# Patient Record
Sex: FEMALE | Race: WHITE | NOT HISPANIC OR LATINO
[De-identification: names, ages, dates, MRNs, and addresses within clinical notes are randomized per-mention and may not be internally consistent; named-entity substitution may affect disease eponyms.]

---

## 2020-12-07 ENCOUNTER — NON-APPOINTMENT (OUTPATIENT)
Age: 30
End: 2020-12-07

## 2020-12-07 PROBLEM — Z00.00 ENCOUNTER FOR PREVENTIVE HEALTH EXAMINATION: Status: ACTIVE | Noted: 2020-12-07

## 2020-12-21 ENCOUNTER — APPOINTMENT (OUTPATIENT)
Dept: OBGYN | Facility: CLINIC | Age: 30
End: 2020-12-21

## 2023-06-11 ENCOUNTER — NON-APPOINTMENT (OUTPATIENT)
Age: 33
End: 2023-06-11

## 2023-08-15 ENCOUNTER — NON-APPOINTMENT (OUTPATIENT)
Age: 33
End: 2023-08-15

## 2024-04-05 ENCOUNTER — EMERGENCY (EMERGENCY)
Facility: HOSPITAL | Age: 34
LOS: 0 days | Discharge: HOME | End: 2024-04-05
Attending: STUDENT IN AN ORGANIZED HEALTH CARE EDUCATION/TRAINING PROGRAM
Payer: COMMERCIAL

## 2024-04-05 VITALS
OXYGEN SATURATION: 98 % | HEART RATE: 93 BPM | WEIGHT: 132.06 LBS | SYSTOLIC BLOOD PRESSURE: 112 MMHG | TEMPERATURE: 98 F | DIASTOLIC BLOOD PRESSURE: 59 MMHG | RESPIRATION RATE: 16 BRPM | HEIGHT: 60 IN

## 2024-04-05 DIAGNOSIS — Z3A.36 36 WEEKS GESTATION OF PREGNANCY: ICD-10-CM

## 2024-04-05 DIAGNOSIS — O22.43 HEMORRHOIDS IN PREGNANCY, THIRD TRIMESTER: ICD-10-CM

## 2024-04-05 DIAGNOSIS — K62.89 OTHER SPECIFIED DISEASES OF ANUS AND RECTUM: ICD-10-CM

## 2024-04-05 DIAGNOSIS — O99.613 DISEASES OF THE DIGESTIVE SYSTEM COMPLICATING PREGNANCY, THIRD TRIMESTER: ICD-10-CM

## 2024-04-05 PROCEDURE — 99284 EMERGENCY DEPT VISIT MOD MDM: CPT

## 2024-04-05 PROCEDURE — 99284 EMERGENCY DEPT VISIT MOD MDM: CPT | Mod: 25

## 2024-04-05 PROCEDURE — 46083 INC THROMBOSED HROID XTRNL: CPT

## 2024-04-05 RX ORDER — CEPHALEXIN 500 MG
1 CAPSULE ORAL
Qty: 14 | Refills: 0
Start: 2024-04-05 | End: 2024-04-11

## 2024-04-05 NOTE — ED ADULT NURSE NOTE - CHIEF COMPLAINT QUOTE
Patient presents to ED c/o multiple hemorrhoids, one possibly thrombosed. Patient with recent diarrhea. 8 months pregnant.

## 2024-04-05 NOTE — ED PROVIDER NOTE - PATIENT PORTAL LINK FT
You can access the FollowMyHealth Patient Portal offered by [facility] by registering at the following website: [email or web address]. By joining [facility]’s FollowMyHealth portal, you will also be able to view your health information using other applications (apps) compatible with our system.

## 2024-04-05 NOTE — ED PROVIDER NOTE - PROGRESS NOTE DETAILS
TERRA:  on bedside US. Partial thrombosed hemorrhoid was excised with small amount of material expressed.  Encouraged supportive care including sitz bath's, advised.  Will give rapid referral for colorectal surgery.  Supportive care and return precaution advised.  Patient is comfortable with plan    Patient to be discharged from ED. Any available test results were discussed with patient and/or family. Verbal instructions given, including instructions to return to ED immediately for any new, worsening, or concerning symptoms. Patient endorsed understanding. Written discharge instructions additionally given, including follow-up plan.

## 2024-04-05 NOTE — ED PROVIDER NOTE - OBJECTIVE STATEMENT
33F  currently 36 weeks Pregnant presents ED for evaluation of rectal pain states thinks she think she has a hemorrhoid.  Patient states she has had no complications to the pregnancy and she is doing well, normal fetal movement however feels lumps to the rectum that are painful x 2 days.  She denies straining to have bowel movement.  Denies fever, CP, SOB, abdominal pain, N/V/D, vaginal bleeding or discharge.

## 2024-04-05 NOTE — ED ADULT NURSE NOTE - OBJECTIVE STATEMENT
pt presents to ED with c/o rectal pain. pt states she has hemorrhoids tried OTC medications but it did not help. pt states she went to her OB Doctor but they sent her here because they think its thrombosed. pt denies fever.

## 2024-04-05 NOTE — ED PROVIDER NOTE - CARE PLAN
1 Principal Discharge DX:	Hemorrhoids, thrombosed   Principal Discharge DX:	Hemorrhoids, thrombosed  Secondary Diagnosis:	Single pregnancy, third trimester

## 2024-04-05 NOTE — ED PROVIDER NOTE - CARE PROVIDER_API CALL
Errol Charles  Colon/Rectal Surgery  [address], [address] [place] [address]  Phone: [phone]  Fax: [phone]  Established Patient  Follow Up Time: 1-3 Days

## 2024-04-05 NOTE — ED PROVIDER NOTE - ATTENDING CONTRIBUTION TO CARE
I personally evaluated the patient. I reviewed the Resident’s or Physician Assistant’s note (as assigned above), and agree with the findings and plan except as documented in my note.  33F  currently 36 weeks Pregnant presents ED for evaluation of rectal pain states thinks she think she has a hemorrhoid.  Patient states she has had no complications to the pregnancy and she is doing well, normal fetal movement however feels lumps to the rectum that are painful x 2 days.    CON: appears stated age, pleasant, no acute distress, HENMT: normocephalic, atraumatic, anicteric, no conjunctival injection,  CV: regular rhythm, distal pulses intact, RESP: no acute respiratory distress, no stridor, breathing comfortably on RA , GI:  soft, nontender, no rebound, no guarding, SKIN: no wounds MSK: no deformities, NEURO: no gross motor or sensory deficit Psychiatric: appropriate mood, appropriate affect  pocus showed   gu exam done at my presence with our resident  small thrombosed external hemorrhoid + one external hemorrhoid reducible none thrombosed  will give pain meds and excise thrombosed hemorrhoid

## 2024-04-05 NOTE — ED PROVIDER NOTE - PHYSICAL EXAMINATION
VITAL SIGNS: I have reviewed nursing notes and confirm.  CONSTITUTIONAL: uncomfortable appearing female I nNAD  SKIN: Skin exam is warm and dry, no acute rash.  HEAD: Normocephalic; atraumatic.  EYES: PERRL, EOM intact; conjunctiva and sclera clear.  ENT: mmm  CARD: S1, S2 normal; no murmurs, gallops, or rubs. Regular rate and rhythm.  RESP: Normal respiratory effort, no tachypnea or distress. Lungs CTAB, no wheezes, rales or rhonchi.  ABD: soft, gravid, NT  rectal exam chaperoned by Dr. Graves shows 2 small hemorrhoids, one of which is partially thrombosed  EXT: Normal ROM. No clubbing, cyanosis or edema.  NEURO: Alert, oriented. Grossly unremarkable. No focal deficits.  PSYCH: Cooperative, appropriate.

## 2024-04-05 NOTE — ED PROVIDER NOTE - NSFOLLOWUPINSTRUCTIONS_ED_ALL_ED_FT
Our Emergency Department Referral Coordinators will be reaching out to you in the next 24-48 hours from 9:00am to 5:00pm with a follow up appointment. Please expect a phone call from the hospital in that time frame. If you do not receive a call or if you have any questions or concerns, you can reach them at   [phone] (colorectal surgery)    ----------------------------------    Hemorrhoids  The colon, with 3 close-ups of the rectum. One is normal and the other 2 show external and internal hemorrhoids.  Hemorrhoids are swollen veins in and around the rectum or the opening of the butt (anus). There are two types of hemorrhoids:  Internal. These occur in the veins just inside the rectum. They may poke through to the outside and become irritated and painful.  External. These occur in the veins outside the anus. They can be felt as a painful swelling or hard lump near the anus.  Most hemorrhoids do not cause severe problems. Often, they can be treated at home with diet and lifestyle changes. If home treatments do not help, you may need a procedure to shrink or remove the hemorrhoids.    What are the causes?  Hemorrhoids are caused by pressure near the anus. This pressure may be caused by:  Constipation or diarrhea.  Straining to poop.  Pregnancy.  Obesity.  Sitting or riding a bike for a long time.  Heavy lifting or other things that cause you to strain.  Anal sex.  What are the signs or symptoms?  Symptoms of this condition include:  Pain.  Anal itching or irritation.  Bleeding from the rectum.  Leakage of poop (stool).  Swelling of the anus.  One or more lumps around the anus.  How is this diagnosed?  Hemorrhoids can often be diagnosed through a visual exam. Other exams or tests may also be done, such as:  A digital rectal exam. This is when your health care provider feels inside your rectum with a gloved finger.  Anoscope. This is an exam of the anus using a small tube.  A blood test, if you have lost a lot of blood.  A sigmoidoscopy or colonoscopy. These are tests to look inside the colon using a tube with a camera on the end.  How is this treated?  In most cases, hemorrhoids can be treated at home with diet and lifestyle changes. If these changes do not help, you may need to have a procedure done. These procedures can make the hemorrhoids smaller or fully remove them. Common procedures include:  Rubber band ligation. Rubber bands are placed at the base of the hemorrhoids to cut off their blood supply.  Sclerotherapy. Medicine is put into the hemorrhoids to shrink them.  Infrared coagulation. A type of light energy is used to get rid of the hemorrhoids.  Hemorrhoidectomy surgery. The hemorrhoids are removed during surgery. Then, the veins that supply them are tied off.  Stapled hemorrhoidopexy surgery. The base of the hemorrhoid is stapled to the wall of the rectum.  Follow these instructions at home:  Medicines    Take over-the-counter and prescription medicines only as told by your provider.  Use medicated creams or medicines that are put in the rectum (suppositories) as told by your provider.  Eating and drinking    Examples of vegetables and other plant-based foods.  Eat foods that are high in fiber, such as beans, whole grains, and fresh fruits and vegetables.  Ask your provider about taking products that have fiber added to them (fiber supplements).  Reduce the amount of fat in your diet. You can do this by eating low-fat dairy products, eating less red meat, and avoiding processed foods.  Drink enough fluid to keep your pee (urine) pale yellow.  Managing pain and swelling    A bathtub partially filled with water.  Take warm sitz baths for 20 minutes, 3–4 times a day. This can help ease pain and discomfort. You may do this in a bathtub or you can use a portable sitz bath that fits over the toilet.  If told, put ice on the affected area. It may help to use ice packs between sitz baths.  Put ice in a plastic bag.  Place a towel between your skin and the bag.  Leave the ice on for 20 minutes, 2–3 times a day.  If your skin turns bright red, remove the ice right away to prevent skin damage. The risk of damage is higher if you cannot feel pain, heat, or cold.  General instructions    Exercise. Ask your provider how much and what kind of exercise is best for you. In general, you should do moderate exercise for at least 30 minutes on most days of the week (150 minutes each week). You may want to try walking, biking, or yoga.  Go to the bathroom when you have the urge to poop. Do not wait.  Avoid straining to poop.  Keep the anus dry and clean. Use wet toilet paper or moist towelettes after you poop.  Do not sit on the toilet for a long time. This can increase blood pooling and pain.  Where to find more information  [employer] [place] [employer]: niddk.nih.gov  Contact a health care provider if:  You have more pain and swelling that do not get better with treatment.  You have trouble pooping or you are not able to poop.  You have pain or inflammation outside the area of the hemorrhoids.  Get help right away if:  You are bleeding from your rectum and you cannot get it to stop.  This information is not intended to replace advice given to you by your health care provider. Make sure you discuss any questions you have with your health care provider.

## 2024-04-05 NOTE — ED PROVIDER NOTE - CLINICAL SUMMARY MEDICAL DECISION MAKING FREE TEXT BOX
33F  currently 36 weeks Pregnant presents ED for evaluation of rectal pain states thinks she think she has a hemorrhoid.  Patient states she has had no complications to the pregnancy and she is doing well, normal fetal movement however feels lumps to the rectum that are painful x 2 days.  exam showed small thrombosed external hemorrhoid, excised blood clot extracted. discharged with colorectal surgery follow up, [facility] and return persecutions

## 2024-04-12 ENCOUNTER — APPOINTMENT (OUTPATIENT)
Dept: SURGERY | Facility: CLINIC | Age: 34
End: 2024-04-12
Payer: COMMERCIAL

## 2024-04-12 DIAGNOSIS — K64.5 PERIANAL VENOUS THROMBOSIS: ICD-10-CM

## 2024-04-12 PROCEDURE — 99202 OFFICE O/P NEW SF 15 MIN: CPT

## 2024-04-12 NOTE — PLAN
[FreeTextEntry1] : Continue sitz bath's and local care.  Follow-up as needed if the hemorrhoid develops into a symptomatic skin tag by about 3 months.  If she has persistent bleeding from internal and/or external hemorrhoid she may follow back up with me for further management.  I went over the possible outcomes of this hemorrhoid disease.

## 2024-04-12 NOTE — HISTORY OF PRESENT ILLNESS
[de-identified] : This is a pleasant 33-year-old female third trimester pregnancy who presented to the ER few days back with a acutely thrombosed hemorrhoid which was very painful.  The ER performed a procedure which it sounds like was either excision or incision and drainage of the thrombosed hemorrhoid.  The patient achieved relief and follows up today for further recommendations and counseling.  We had extensive discussion about the natural course of hemorrhoids.  She feels much better and should be doing sitz bath's.  The hemorrhoid is still present and there is 1 area of thrombosis so I assume that they did a incision and drainage and got some clot out.  She may have an residual hemorrhoidal tag that persists or resolves.  We had a discussion about the possibilities in regards to that.  All her questions were answered.  There is no intervention required at this time.

## 2024-04-12 NOTE — REASON FOR VISIT
[Initial Evaluation] : an initial evaluation [FreeTextEntry1] : Hemorrhoids status post ER procedure

## 2024-06-22 ENCOUNTER — NON-APPOINTMENT (OUTPATIENT)
Age: 34
End: 2024-06-22

## 2025-01-11 ENCOUNTER — NON-APPOINTMENT (OUTPATIENT)
Age: 35
End: 2025-01-11

## 2025-01-19 ENCOUNTER — NON-APPOINTMENT (OUTPATIENT)
Age: 35
End: 2025-01-19

## 2025-01-20 ENCOUNTER — NON-APPOINTMENT (OUTPATIENT)
Age: 35
End: 2025-01-20